# Patient Record
Sex: FEMALE | Race: WHITE | Employment: PART TIME | URBAN - METROPOLITAN AREA
[De-identification: names, ages, dates, MRNs, and addresses within clinical notes are randomized per-mention and may not be internally consistent; named-entity substitution may affect disease eponyms.]

---

## 2017-11-20 ENCOUNTER — NURSE TRIAGE (OUTPATIENT)
Dept: NURSING | Facility: CLINIC | Age: 22
End: 2017-11-20

## 2017-11-20 NOTE — TELEPHONE ENCOUNTER
"  [facility] Triage Call  Presenting Problem:\"I was cleaning my ear yesterday with a Q-tip.\"  Patient described \"Pressure and  \"20%\" loss of  hearing in her (R) ear.     Guideline Used: Ear Problem - Adult.     Patient Recommendations/Teaching:See Provider within 24  Hours.     Reason for Disposition    Hearing is decreased on injured side    Additional Information    Negative: Knocked out (unconscious) > 1 minute    Negative: Difficult to awaken or acting confused  (e.g., disoriented, slurred speech)    Negative: Severe neck pain    Negative: Sounds like a life-threatening emergency to the triager    Negative: Wound looks infected    Negative: Pierced ear, problems and symptoms    Negative: Caused by lightning    Negative: [1] Bleeding AND [2] won't stop after 10 minutes of direct pressure (using correct technique)    Negative: Skin is split open or gaping  (or length > 1/4 inch or 6 mm)    Negative: [1] Animal or human bite AND [2] skin is broken (abraded, lacerated)    Negative: Walking is unsteady (i.e., falling or tilting to one side)    Negative: Sounds like a serious injury to the triager    Negative: [1] SEVERE pain AND [2] not improved 2 hours after pain medicine    Negative: [1] Pointed object was inserted into the ear canal AND [2] now has bleeding or earache     (Exception: doctor's ear exam)    Negative: [1] Cotton swab (e.g., Q-tip) inserted with force AND [2] pain persists > 30 minutes    Negative: [1] Bleeding recurs AND [2] from cotton swab or other minor trauma    Negative: [1] Direct blow to area (e.g., ball, slapped hard) AND [2] bleeding from ear canal    Protocols used: EAR INJURY-ADULT-    "

## 2020-01-11 ENCOUNTER — OFFICE VISIT (OUTPATIENT)
Dept: URGENT CARE | Facility: URGENT CARE | Age: 25
End: 2020-01-11
Payer: COMMERCIAL

## 2020-01-11 VITALS
HEIGHT: 69 IN | SYSTOLIC BLOOD PRESSURE: 104 MMHG | TEMPERATURE: 98.5 F | RESPIRATION RATE: 16 BRPM | WEIGHT: 154 LBS | HEART RATE: 74 BPM | DIASTOLIC BLOOD PRESSURE: 66 MMHG | BODY MASS INDEX: 22.81 KG/M2

## 2020-01-11 DIAGNOSIS — G43.909 MIGRAINE WITHOUT STATUS MIGRAINOSUS, NOT INTRACTABLE, UNSPECIFIED MIGRAINE TYPE: Primary | ICD-10-CM

## 2020-01-11 PROCEDURE — 99203 OFFICE O/P NEW LOW 30 MIN: CPT | Performed by: FAMILY MEDICINE

## 2020-01-11 ASSESSMENT — MIFFLIN-ST. JEOR: SCORE: 1522.92

## 2020-01-11 NOTE — LETTER
January 11, 2020      Erendira Lujan  [address]   [facility] [address]        To Whom It May Concern:        Erendira Lujan was seen in our clinic. Kindly excuse her work absence for today.         Sincerely,          Sergio Avila MD

## 2020-01-12 NOTE — PATIENT INSTRUCTIONS
Patient Education     About Migraine Headaches  What is a migraine headache?  A migraine is a special kind of headache. It can last a for hours or days. It may cause intense pain. You may also feel sick to your stomach or have eye problems.  What causes it?  We don't know the exact cause. They may be caused by a problem with blood flow to the brain. Or they may happen when brain chemicals don't stay balanced. You are more likely to get a migraine when:    You are under stress    You are tired    You eat some kinds of foods, such as wine, cheese, chocolate or chemicals added to foods    You are around bright lights  Women are more likely to have migraines than men. Sometimes the headaches happen around the time a woman has her period. Or they may happen when a woman is taking hormone pills.   Migraines can run in families.  What are symptoms?  Before a migraine, you may:    Not feel well    Lose part of your vision    See bright spots    See zigzags in front of your eyes  Most of the time, these problems go away when the headache starts. When you have a migraine, you may:    Have a headache that throbs or pounds (you may feel the pain more on one side of your head, or your whole head may hurt)    Be very sensitive to light    Have blurry vision    Vomit (throw up) or have nausea (feel sick to your stomach)    Have numbness or tingling in your face or arm  How is it diagnosed?  Your care team will ask you about your symptoms and medical history. They will examine you.   It may help to keep a headache diary. You should write down:    The date and time of each headache    How long it lasts    The type of pain (is it dull or sharp? Does it throb? Do you feel pressure?)    Where it hurts (for example, scalp, eyes, temples, neck)    What symptoms you had before the headache started    What you ate and drank before the headache    If you used cigarettes, caffeine, alcohol or soda    What time you went to bed the night  before, and what time you woke up that day    If you are a woman, you should also note if you are using birth control pills or taking other female hormones  If you just recently started having headaches, your care team may suggest tests to look for the causes of your symptoms. You may need a brain scan or MRI.  How is it treated?    You may need to take medicines to keep migraines from getting worse once they start. Take these medicines as soon as you can when you start to have signs of a migraine.    You may need to take another medicine every day to stop migraines from coming so often. The medicine can help prevent very bad migraines.    You may need to try a medicine for a few weeks to see if it works. Be sure to keep your follow-up appointments with your care team to see if a medicine is working and what you should try next. Talk to your care team about what is best for you. You may have many choices.  How long will it last?  The headache may last from a few hours to a few days. You may get migraines for the rest of your life. Most of the time, migraines happen less often as you get older.  How can I take care of myself?  As soon as the migraine starts:    Take a pain reliever. Ask your care team what medicine you should take.    Rest in a quiet, dark room until you start to feel better.    Don't drive a car while you have a migraine.    See your care team if your headaches get worse or if they don't get better when you take medicine for them. It may take a few visits to find the best way to control your headaches.  How can I prevent migraines?    Eat regular, healthy meals. Don't go too long without eating. Stay away from foods that seem to cause headaches. Watch out for:  ? Wine, elizabeth and beer  ? Cheese  ? Aged, canned and processed meats  ? Bread made with yeast  ? Foods with cheese, chocolate or nuts    Don't use medicines that can cause headaches. Ask your care team about this. You may need to stop using  birth control or hormone pills.    Don't smoke cigarettes    Get plenty of sleep every day    Lower your stress. Find time to relax, rest and have fun in your life.  When should I call my care team?  Call your care team right away if you have symptoms that you don't usually get with migraines or you have other unusual symptoms, such as:    Problems talking or your speech is slurred    A weak arm or leg that you can't move in a normal way    A fever higher than 100 F (37.8 C)    Stiff neck    Vomiting that lasts for a few hours  For more information  National Headache Foundation (NHF)  www.headaches.org.  For informational purposes only. Not to replace the advice of your health care provider.   Copyright   2011 [facility]. All rights reserved. Clinically reviewed by [facility]. [employer] [id number] - 08/18.  For informational purposes only. Not to replace the advice of your health care provider.  Copyright   2018 [facility]. All rights reserved.

## 2020-01-12 NOTE — PROGRESS NOTES
Subjective     Erendira Lujan is a 24 year old female who presents to clinic today for the following health issues:    HPI   Chief Complaint   Patient presents with     Headache     headache started this morning. Missed work,needs note.       Duration: today     Description (location/character/radiation): known to have migraine, headache started at noon, took Excedrin, started feeling better now    Intensity:  moderate    Accompanying signs and symptoms: nausea    History (similar episodes/previous evaluation): migraine     Precipitating or alleviating factors: None    Therapies tried and outcome: Excedrin        Patient Active Problem List   Diagnosis     MENTAL HEALTH     Tylenol ingestion     Suicidal ideation     Depressed     No past surgical history on file.    Social History     Tobacco Use     Smoking status: Never Smoker     Smokeless tobacco: Never Used     Tobacco comment: parents smoke in house   Substance Use Topics     Alcohol use: No     No family history on file.      Current Outpatient Medications   Medication Sig Dispense Refill     MedroxyPROGESTERone Acetate (DEPO-PROVERA IM) Inject  into the muscle. Mother said next shot due end of  December 2012        Allergies   Allergen Reactions     Seasonal Allergies      Recent Labs   Lab Test 12/06/12  0847 06/25/12  1145   ALT 21 <6   CR 0.70 0.70   GFRESTIMATED GFR not calculated, patient <16 years old. GFR not calculated, patient <16 years old.   GFRESTBLACK GFR not calculated, patient <16 years old. GFR not calculated, patient <16 years old.   POTASSIUM 4.3 5.4*   TSH 1.79  --       BP Readings from Last 3 Encounters:   01/11/20 104/66   12/07/12 114/74 (65 %/ 78 %)*   06/25/12 118/76     *BP percentiles are based on the 2017 AAP Clinical Practice Guideline for girls    Wt Readings from Last 3 Encounters:   01/11/20 69.9 kg (154 lb)   12/05/12 66.1 kg (145 lb 12 oz) (85 %)*     * Growth percentiles are based on CDC (Girls, 2-20 Years) data.     "             Reviewed and updated as needed this visit by Provider         Review of Systems   ROS COMP: Constitutional, HEENT, cardiovascular, pulmonary, gi and gu systems are negative, except as otherwise noted.      Objective    /66 (Cuff Size: Adult Regular)   Pulse 74   Temp 98.5  F (36.9  C) (Oral)   Resp 16   Ht 1.753 m (5' 9\")   Wt 69.9 kg (154 lb)   BMI 22.74 kg/m    Body mass index is 22.74 kg/m .  Physical Exam   GENERAL: healthy, alert and no distress  NECK: no adenopathy, no asymmetry, masses, or scars and thyroid normal to palpation  RESP: lungs clear to auscultation - no rales, rhonchi or wheezes  CV: regular rate and rhythm, normal S1 S2, no S3 or S4, no murmur, click or rub, no peripheral edema and peripheral pulses strong  ABDOMEN: soft, nontender, no hepatosplenomegaly, no masses and bowel sounds normal  MS: no gross musculoskeletal defects noted, no edema  NEURO: Normal strength and tone, mentation intact and speech normal      Assessment & Plan     (G43.909) Migraine without status migrainosus, not intractable, unspecified migraine type  (primary encounter diagnosis)  Comment: Suspect symptoms secondary to migraine.  Took Excedrin this afternoon, symptoms already improving.  Workability note provided.  Suggested to continue well hydration, over-the-counter analgesia and rest.  Instructed to go ER if symptoms persist or worsen.  Patient understood and in agreement with above plan.  All questions answered.         Patient Instructions     Patient Education     About Migraine Headaches  What is a migraine headache?  A migraine is a special kind of headache. It can last a for hours or days. It may cause intense pain. You may also feel sick to your stomach or have eye problems.  What causes it?  We don't know the exact cause. They may be caused by a problem with blood flow to the brain. Or they may happen when brain chemicals don't stay balanced. You are more likely to get a migraine " when:    You are under stress    You are tired    You eat some kinds of foods, such as wine, cheese, chocolate or chemicals added to foods    You are around bright lights  Women are more likely to have migraines than men. Sometimes the headaches happen around the time a woman has her period. Or they may happen when a woman is taking hormone pills.   Migraines can run in families.  What are symptoms?  Before a migraine, you may:    Not feel well    Lose part of your vision    See bright spots    See zigzags in front of your eyes  Most of the time, these problems go away when the headache starts. When you have a migraine, you may:    Have a headache that throbs or pounds (you may feel the pain more on one side of your head, or your whole head may hurt)    Be very sensitive to light    Have blurry vision    Vomit (throw up) or have nausea (feel sick to your stomach)    Have numbness or tingling in your face or arm  How is it diagnosed?  Your care team will ask you about your symptoms and medical history. They will examine you.   It may help to keep a headache diary. You should write down:    The date and time of each headache    How long it lasts    The type of pain (is it dull or sharp? Does it throb? Do you feel pressure?)    Where it hurts (for example, scalp, eyes, temples, neck)    What symptoms you had before the headache started    What you ate and drank before the headache    If you used cigarettes, caffeine, alcohol or soda    What time you went to bed the night before, and what time you woke up that day    If you are a woman, you should also note if you are using birth control pills or taking other female hormones  If you just recently started having headaches, your care team may suggest tests to look for the causes of your symptoms. You may need a brain scan or MRI.  How is it treated?    You may need to take medicines to keep migraines from getting worse once they start. Take these medicines as soon as you  can when you start to have signs of a migraine.    You may need to take another medicine every day to stop migraines from coming so often. The medicine can help prevent very bad migraines.    You may need to try a medicine for a few weeks to see if it works. Be sure to keep your follow-up appointments with your care team to see if a medicine is working and what you should try next. Talk to your care team about what is best for you. You may have many choices.  How long will it last?  The headache may last from a few hours to a few days. You may get migraines for the rest of your life. Most of the time, migraines happen less often as you get older.  How can I take care of myself?  As soon as the migraine starts:    Take a pain reliever. Ask your care team what medicine you should take.    Rest in a quiet, dark room until you start to feel better.    Don't drive a car while you have a migraine.    See your care team if your headaches get worse or if they don't get better when you take medicine for them. It may take a few visits to find the best way to control your headaches.  How can I prevent migraines?    Eat regular, healthy meals. Don't go too long without eating. Stay away from foods that seem to cause headaches. Watch out for:  ? Wine, elizabeth and beer  ? Cheese  ? Aged, canned and processed meats  ? Bread made with yeast  ? Foods with cheese, chocolate or nuts    Don't use medicines that can cause headaches. Ask your care team about this. You may need to stop using birth control or hormone pills.    Don't smoke cigarettes    Get plenty of sleep every day    Lower your stress. Find time to relax, rest and have fun in your life.  When should I call my care team?  Call your care team right away if you have symptoms that you don't usually get with migraines or you have other unusual symptoms, such as:    Problems talking or your speech is slurred    A weak arm or leg that you can't move in a normal way    A fever higher  than 100 F (37.8 C)    Stiff neck    Vomiting that lasts for a few hours  For more information  National Headache Foundation (NHF)  www.headaches.org.  For informational purposes only. Not to replace the advice of your health care provider.   Copyright   2011 [employer]. All rights reserved. Clinically reviewed by [facility]. [employer] [id number] - 08/18.  For informational purposes only. Not to replace the advice of your health care provider.  Copyright   2018 [employer] [facility]. All rights reserved.               Sergio Avila MD  [facility]